# Patient Record
Sex: FEMALE | Race: WHITE | Employment: FULL TIME
[De-identification: names, ages, dates, MRNs, and addresses within clinical notes are randomized per-mention and may not be internally consistent; named-entity substitution may affect disease eponyms.]

---

## 2017-06-17 ENCOUNTER — HEALTH MAINTENANCE LETTER (OUTPATIENT)
Age: 52
End: 2017-06-17

## 2020-10-05 ENCOUNTER — NURSE TRIAGE (OUTPATIENT)
Dept: NURSING | Facility: CLINIC | Age: 55
End: 2020-10-05

## 2020-10-05 NOTE — TELEPHONE ENCOUNTER
Called to find out how to keep others from being exposed to COVID-19 infection virus.  Caller states that she had a tele health visit with an on Care provider and will not be able to get in until Wednesday (10/7).  Caller wanted to know if the test can be done before Wednesday.  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  Ania Joya RN    Additional Information    Negative: [1] Caller is not with the adult (patient) AND [2] reporting urgent symptoms    Negative: Lab result questions    Negative: Medication questions    Negative: Caller can't be reached by phone    Negative: Caller has already spoken to PCP or another triager    Negative: RN needs further essential information from caller in order to complete triage    Negative: Requesting regular office appointment    Negative: [1] Caller requesting NON-URGENT health information AND [2] PCP's office is the best resource    Health Information question, no triage required and triager able to answer question     How to protect others from being exposed to COVID-I9 infection virus    Protocols used: INFORMATION ONLY CALL-A-

## 2021-10-21 ENCOUNTER — APPOINTMENT (OUTPATIENT)
Dept: ULTRASOUND IMAGING | Facility: CLINIC | Age: 56
End: 2021-10-21
Attending: EMERGENCY MEDICINE
Payer: COMMERCIAL

## 2021-10-21 ENCOUNTER — HOSPITAL ENCOUNTER (EMERGENCY)
Facility: CLINIC | Age: 56
Discharge: HOME | End: 2021-10-21
Attending: EMERGENCY MEDICINE | Admitting: EMERGENCY MEDICINE
Payer: COMMERCIAL

## 2021-10-21 VITALS
HEART RATE: 85 BPM | TEMPERATURE: 97.5 F | OXYGEN SATURATION: 99 % | RESPIRATION RATE: 16 BRPM | BODY MASS INDEX: 23.49 KG/M2 | SYSTOLIC BLOOD PRESSURE: 170 MMHG | WEIGHT: 150 LBS | DIASTOLIC BLOOD PRESSURE: 100 MMHG

## 2021-10-21 DIAGNOSIS — I80.02 THROMBOPHLEBITIS OF SUPERFICIAL VEINS OF LEFT LOWER EXTREMITY: ICD-10-CM

## 2021-10-21 LAB
ANION GAP SERPL CALCULATED.3IONS-SCNC: 9 MMOL/L (ref 3–14)
BASOPHILS # BLD AUTO: 0.1 10E3/UL (ref 0–0.2)
BASOPHILS NFR BLD AUTO: 1 %
BUN SERPL-MCNC: 8 MG/DL (ref 7–30)
CALCIUM SERPL-MCNC: 8.9 MG/DL (ref 8.5–10.1)
CHLORIDE BLD-SCNC: 104 MMOL/L (ref 94–109)
CO2 SERPL-SCNC: 26 MMOL/L (ref 20–32)
CREAT SERPL-MCNC: 0.93 MG/DL (ref 0.52–1.04)
EOSINOPHIL # BLD AUTO: 0.4 10E3/UL (ref 0–0.7)
EOSINOPHIL NFR BLD AUTO: 4 %
ERYTHROCYTE [DISTWIDTH] IN BLOOD BY AUTOMATED COUNT: 14.1 % (ref 10–15)
GFR SERPL CREATININE-BSD FRML MDRD: 69 ML/MIN/1.73M2
GLUCOSE BLD-MCNC: 74 MG/DL (ref 70–99)
HCT VFR BLD AUTO: 41.3 % (ref 35–47)
HGB BLD-MCNC: 13.4 G/DL (ref 11.7–15.7)
IMM GRANULOCYTES # BLD: 0 10E3/UL
IMM GRANULOCYTES NFR BLD: 0 %
INR PPP: 0.91 (ref 0.85–1.15)
LYMPHOCYTES # BLD AUTO: 1.3 10E3/UL (ref 0.8–5.3)
LYMPHOCYTES NFR BLD AUTO: 14 %
MCH RBC QN AUTO: 31.9 PG (ref 26.5–33)
MCHC RBC AUTO-ENTMCNC: 32.4 G/DL (ref 31.5–36.5)
MCV RBC AUTO: 98 FL (ref 78–100)
MONOCYTES # BLD AUTO: 0.8 10E3/UL (ref 0–1.3)
MONOCYTES NFR BLD AUTO: 9 %
NEUTROPHILS # BLD AUTO: 6.6 10E3/UL (ref 1.6–8.3)
NEUTROPHILS NFR BLD AUTO: 72 %
NRBC # BLD AUTO: 0 10E3/UL
NRBC BLD AUTO-RTO: 0 /100
PLATELET # BLD AUTO: 248 10E3/UL (ref 150–450)
POTASSIUM BLD-SCNC: 3.9 MMOL/L (ref 3.4–5.3)
RBC # BLD AUTO: 4.2 10E6/UL (ref 3.8–5.2)
SODIUM SERPL-SCNC: 139 MMOL/L (ref 133–144)
WBC # BLD AUTO: 9.2 10E3/UL (ref 4–11)

## 2021-10-21 PROCEDURE — 36415 COLL VENOUS BLD VENIPUNCTURE: CPT | Performed by: EMERGENCY MEDICINE

## 2021-10-21 PROCEDURE — 85610 PROTHROMBIN TIME: CPT | Performed by: EMERGENCY MEDICINE

## 2021-10-21 PROCEDURE — 80048 BASIC METABOLIC PNL TOTAL CA: CPT | Performed by: EMERGENCY MEDICINE

## 2021-10-21 PROCEDURE — 85004 AUTOMATED DIFF WBC COUNT: CPT | Performed by: EMERGENCY MEDICINE

## 2021-10-21 PROCEDURE — 250N000013 HC RX MED GY IP 250 OP 250 PS 637: Performed by: EMERGENCY MEDICINE

## 2021-10-21 PROCEDURE — 99284 EMERGENCY DEPT VISIT MOD MDM: CPT | Mod: 25

## 2021-10-21 PROCEDURE — 93971 EXTREMITY STUDY: CPT | Mod: LT

## 2021-10-21 RX ORDER — APIXABAN 5 MG (74)
KIT ORAL
Qty: 74 EACH | Refills: 0 | Status: SHIPPED | OUTPATIENT
Start: 2021-10-21 | End: 2021-10-23

## 2021-10-21 RX ADMIN — APIXABAN 10 MG: 5 TABLET, FILM COATED ORAL at 23:26

## 2021-10-22 NOTE — DISCHARGE INSTRUCTIONS
Continue Eliquis  See your doctor on Monday  Return if feeling worse or more pain or swelling in your leg

## 2021-10-22 NOTE — ED PROVIDER NOTES
History     Chief Complaint:    Leg Pain      HPI   Isa Higgins is a 56 year old female who presents with left inner thigh pain that started around 3-4pm today.  She feels a lump on the left inner thigh that is tender.  She recently had vein stripping in her left inner lower leg.  She said that it has felt somewhat tingly since then.  She denies shortness of breath or chest pain.  There is no redness on her leg.  She did recently return from [facility] 2 weeks ago.  She denies any trauma to the area.  She denies any history of blood clots.    Review of Systems  Please see HPI. All other systems reviewed and negative.      Allergies:  Oxycodone  Sulfa Drugs      Medications:    Bupropion  Diazepam  Levothyroxine    Past Medical History:    Thyroid disease    Social History:  The patient was not accompanied to the ED.    Physical Exam     Patient Vitals for the past 24 hrs:   BP Temp Temp src Pulse Resp SpO2 Weight   10/21/21 2051 (!) 170/100 97.5  F (36.4  C) Temporal 85 16 99 % 68 kg (150 lb)       Physical Exam  General- alert, cooperative  Pulm- normal respiratory effort, no respiratory distress  Msk-            RLE: 2+ pulses, sensation to light touch intact, no wounds or abrasions   ROM normal without difficulty, 5/5 strength           LLE: 2+ pulses, sensation intact to light touch, no wounds or abrasions   ROM normal without difficulty, 5/5 strength. Slight tender lump mid inner thigh, no redness  Skin- no cyanosis or edema, no swelling, no rash or petechiae  Psych- normal mood and affect, normal behavior             Emergency Department Course     Imaging:  US Lower Extremity Venous Duplex Left:  1.  No deep venous thrombosis in the left lower extremity. Occlusive superficial thrombophlebitis involving left greater saphenous vein beginning 2 cm below the saphenofemoral junction and extending into the calf.  Report per radiology.    Laboratory:  CBC: WBC 9.2, HGB 13.4,   BMP: AWNL (Creatinine  0.93)    INR: 0.91    Emergency Department Course:    Reviewed:  I reviewed nursing notes, vitals, past history and care everywhere    Assessments:   I obtained history and examined the patient as noted above.   2310 I rechecked the patient and explained findings.     Interventions:   Eliquis 10 mg PO    Disposition:  The patient was discharged to home.    Impression & Plan      Medical Decision Making:  Patient presents today for evaluation of left leg pain and swelling.  She does not have a history of DVT or PE but did recently travel to [place] and back.  Ultrasound shows superficial thrombophlebitis that is quite extensive and near the saphenofemoral junction.  Due to this finding, patient does requires anticoagulation.  She is comfortable with the plan we did discuss precaution when she is starting anticoagulation.  We discussed avoiding any activity that could cause trauma that would cause serious bleeding.  She is asked to follow-up with her doctor next week to see how she is doing.  At that point he can decide how long she should stay on this given that this is not a DVT but just rather superficial thrombophlebitis.  Patient voiced understanding and first dose of Eliquis has been given today.    Diagnosis:    ICD-10-CM    1. Thrombophlebitis of superficial veins of left lower extremity  I80.02        Discharge Medications:  New Prescriptions    APIXABAN STARTER PACK (ELIQUIS DVT/PE STARTER PACK) 5 MG TBPK    Take 10 mg by mouth 2 times daily for 7 days, THEN 5 mg 2 times daily for 23 days.       Scribe Disclosure:  I, Idalia York, am serving as a scribe at 9:07 PM on 10/21/2021 to document services personally performed by Anuj Overton MD based on my observations and the provider's statements to me.          Anuj Overton MD  10/21/21 [id number]